# Patient Record
Sex: FEMALE | Race: BLACK OR AFRICAN AMERICAN | Employment: OTHER | URBAN - METROPOLITAN AREA
[De-identification: names, ages, dates, MRNs, and addresses within clinical notes are randomized per-mention and may not be internally consistent; named-entity substitution may affect disease eponyms.]

---

## 2018-07-30 ENCOUNTER — HOSPITAL ENCOUNTER (OUTPATIENT)
Dept: PHYSICAL THERAPY | Age: 53
Discharge: HOME | End: 2018-07-30
Payer: OTHER GOVERNMENT

## 2018-07-30 PROCEDURE — 97161 PT EVAL LOW COMPLEX 20 MIN: CPT | Performed by: PHYSICAL THERAPIST

## 2018-07-30 PROCEDURE — 97110 THERAPEUTIC EXERCISES: CPT | Performed by: PHYSICAL THERAPIST

## 2018-07-30 NOTE — PROGRESS NOTES
PT  EVAL AND TREATMENT    Patient Name: Ariela Drafts  Date:2018  : 1965  [x]  Patient  Verified  Payor: /    In time:140  Out time:2:35pm  Total Treatment Time (min): 55  Total Timed Codes (min): 55  1:1 Treatment Time ( W Kuldip Suarez only): 54   Visit #: 1 of     Treatment Area: Myalgia [M79.1]      Objective evaluation:  Physical Therapy Evaluation Cervical Spine - [facility]    SUBJECTIVE  Chief Complaint: patient states she woke up and arm was hurting, and states she could get no relief. She has a round of Prednisone, which gave pain relief for about a month, then came back. She reports L shoulder and UE pain, stopping above the elbow. Patient reports occasional c/s pain. MRI was performed on c/s, shoulder yesterday, with results unknown. Pain is a deep aching sensation. Pain ranges from 2 to 6-7. Pain increases with dressing, reaching OH, taking a shower, picking up items, lying on L side. Pain reduces with heat pack, medication. Mechanism of injury:    Symptoms  Pain rating (0-10):    Today: 2   Best:    Worst:    [x] Contstant:    [] Intermittent:     Aggravated by:   [] Bending [] Sitting [] Standing [] Reaching Overhead   [x] Moving [] Cough [] Sneeze [] Eating   [] AM  [] PM  Lying:  [] sup   [] pro   [x] sidelying   [] Other:lifting   Eased by:    [] Bending [] Sitting [] Standing Lying: [x] sup  [] pro  [] sidelying   [] Moving [] AM  [] PM  [] Other:       Past History/Treatments:    Diagnostic Tests: [] Lab work [] X-rays    [] CT [x] MRI     [] Other:  Results:    Functional Status  Prior level of function:  Present functional limitations:  What position do you sleep in?:    Physical Therapy Evaluation - Shoulder    Posture: [] Poor    [] Fair    [] Good    Describe: FH posture     ROM:  [] Unable to assess at this time                                           AROM                                                              PROM   Left Right  Left Right   Flexion 65deg 138 Flexion 90deg Extension 33  Extension     Scaption/ABD 50 125 Scaption/ABD 80deg    ER @ 0 Degrees neutral  ER @ 0 Degrees neutral    ER @ 90 Degrees   ER @ 90 Degrees     IR @ 90 Degrees To prox glut T5 IR @ 90 Degrees     C/s AROM is 75% all ranges with no p! End Feel / Painful Arc:    Strength:   [x] Unable to assess at this time       R is 4+ overall                                                                        L (1-5) R (1-5) Pain   Flexors  4+ [] Yes   [] No   Abductors 4-p! [] Yes   [] No   External Rotators   [] Yes   [] No   Internal Rotators   [] Yes   [] No   Supraspinatus   [] Yes   [] No   Serratus Anterior   [] Yes   [] No   Lower Trapezius   [] Yes   [] No   Elbow Flexion 4p! [] Yes   [] No   Elbow Extension   [] Yes   [] No       Scapulohumoral Control / Rhythm:  Able to eccentrically lower with good control?  Left: [] Yes   [x] No     Right: [x] Yes   [] No    Accessory Motions: tightness in post/inferior    Palpation  [] Min  [x] Mod  [] Severe    Location: L LS, UT mm, SA space,  [] Min  [] Mod  [] Severe    Location:  [] Min  [] Mod  [] Severe    Location:    Optional Tests:    Sensation Left Right Reflexes Left Right   Biceps (C5)   Biceps (C5)     Gutiérrez Radial(C6-7)   Brachioradialis (C6)     Gutiérrez Ulnar(C8-T1)   Triceps (C7)       Adson's Test  [] Pos   [] Neg Yergason's Test [] Pos   [] Neg  Tracie's Test  [] Pos   [] Neg [place]'s Sign [] Pos   [] Neg  Neer's Test  [x] Pos   [] Neg Clunk Test  [] Pos   [] Neg  Hawkin's Test  [x] Pos   [] Neg AC Joint  [] Pos   [] Neg  Speed's Test  [] Pos   [] Neg SC Joint  [] Pos   [] Neg  Empty Can  [] Pos   [] Neg Pectoral Tightness [x] Pos   [] Neg  Anterior Apprehension [] Pos   [] Neg   Posterior Apprehension [] Pos   [] Neg      Other Tests / Comments:       OBJECTIVE  Posture: [] WNL  Head Position: FHRS on L  Shoulder/Scapular Position: protracted      TMJ: [] N/A [] Abnormal - ROM:   Palpation:    Cervical Retraction: [x] WNL    [] Abnormal:    Shoulder/Scapular Screen: [] WNL    [x] Abnormal:    Thoracic Spine: [] N/A    [] WNL   [] Other:    PROM:    Palpation:  [] Min  [] Mod  [] Severe    Location:  [] Min  [] Mod  [] Severe    Location:  [] Min  [] Mod  [] Severe    Location:    Neuro Screen (myotome/dematome/felexes): [x] WNL  Myotome Level Muscle Test Myotome Level Muscle Test   C5 Shoulder Adduction - Deltoid C8 Finger Flexors   C6 Wrist Extension T1 Finger Abduction - Interossei   C7 Elbow Extension     Comments:      Special Tests:  Cervical:        Vertebral Artery:  [] R    [] L    [] +    [] -       Alar Ligament: [] R    [] L    [] +    [] -       Transverse Lig: [] R    [] L    [] +    [] -       Spurling's:  [] R    [x] L    [] +    [x] -       Distraction:  [] R    [] L    [] +    [] -       Compression: [] R    [] L    [] +    [] -      Diaphragmatic Breathing: [] Normal    [] Abnormal          Justification for Eval Code Complexity:  Patient History : HTN  Examination see exam as above   Clinical Presentation: evolving   Clinical Decision Making : FOTO : 77 /100        Patient Education: [x] Established HEP    [x] POT (minutes) :15 min HEP     Pain Level (0-10 scale) post treatment:   ASSESSMENT  [x]  See Plan of Care    PLAN  [x]  Upgrade activities as tolerated     [x] Other:_ POC  2-3 x/wk for 4 weeks    Mandy Oro, PT 7/30/2018  1:41 PM

## 2018-07-30 NOTE — PROGRESS NOTES
[id number] [facility] AT [facility]  [address] [place] [address],[address], Faustino chapman, Jennifer Hernandez  Phone [phone]  Fax [phone] / 007 [facility] PHYSICAL THERAP[facility]  Patient Name: Collin Blas : 1965   Medical   Diagnosis: Myalgia [M79.1] Treatment Diagnosis: myalgia   Onset Date: 6 months     Referral Source: Shan Witt MD Start of Care [facility]): 2018   Prior Hospitalization: See medical history Provider #: [id number]   Prior Level of Function: Independent   Comorbidities: HTN   Medications: Verified on Patient Summary List   The Plan of Care and following information is based on the information from the initial evaluation.   =================================================================================  Assessment / key information: Patient is a 45 y.o. female who presents to [facility] with Dx of myalgia. Patient states she woke up 6 months ago and arm was hurting, and states she could get no relief. She has a round of Prednisone, which gave pain relief for about a month, then came back. She reports L shoulder and UE pain, stopping above the elbow. Patient reports occasional c/s pain. MRI was performed on c/s, shoulder yesterday, with results unknown. Pain is a deep aching sensation. Pain ranges from 2 to 6-7. Pain increases j(functional limitations) with dressing, reaching OH, taking a shower, picking up items, lying on L side. Pain reduces with heat pack, medication. Average reported pain level  2/at best & 9/at worst. Upon objective evaluation patient demonstrates FH posture with L UE in retracted/fwd, guarded position. L shoulder AROM decreased and painful:  flexion: 65deg, scaption: 50deg, ER: to neutral, FIR to proximal glut. Strength testing deferred due to high pain levels. TTP in post cuff mm, pect mm, L UT/LS mm, L Subacromial space. R shoulder AROM is WFL, strength is 4+ overall.  Special tests: Ibarra/ling +, Neers +. Decreased capsular mobility noted inferior/posterior. Neg spurlings and c/s was cleared. Patient scored 76 on FOTO indicating decreased functional activity level and QOL. A home exercise program was demonstrated and provided to address the above objective and functional deficits. Patient can benefit from PT interventions to improve AROM, strength, decrease pain, to facilitate ADLs & overall functional status.   =========================================================================  Eval Complexity: History: MEDIUM  Complexity : 1-2 comorbidities / personal factors will impact the outcome/ POC Exam:MEDIUM Complexity : 3 Standardized tests and measures addressing body structure, function, activity limitation and / or participation in recreation  Presentation: MEDIUM Complexity : Evolving with changing characteristics  Clinical Decision Making:LOW Complexity : FOTO score of 75-100Overall Complexity:LOW   Problem List: pain affecting function, decrease ROM, decrease strength, edema affecting function, impaired gait/ balance, decrease ADL/ functional abilitiies, decrease activity tolerance, decrease flexibility/ joint mobility and decrease transfer abilities   Treatment Plan may include any combination of the following: Therapeutic exercise, Therapeutic activities, Neuromuscular re-education, Physical agent/modality, Manual therapy and Patient education  Patient / Family readiness to learn indicated by: asking questions, trying to perform skills and interest  Persons(s) to be included in education: patient (P)  Barriers to Learning/Limitations: None  Measures taken:    Patient Goal (s): No pain, regain strength   Rehabilitation Potential: excellent   Short Term Goals: To be accomplished in  2  weeks:  1) Establish HEP to prevent further disability. 2) Patient will report decreased c/o pain to < or = 3/10 to facilitate dressing/bathing with manageable sx in L shoulder.      Long Term Goals: To be accomplished in  4  weeks:  1) Patient to be independent & compliant with HEP in preparation for D/C.  2) Improve overall strength of L shoulder to 4+/5 so strength is available for return to light lifting activities at work/home. 3) Patient to report 75% improvement in overall function in preparation for return to ADLS with manageable sx in L shoulder. 4) Patient to increase FOTO score to 77 indicating improved functional abilities and QOL  5.) Patient to increase AROM of L shoulder flexion and scaption to 120deg to facilitate reaching/dressing.  6.) Patient to increase FIR to L1 to facilitate dressing. Frequency / Duration:   Patient to be seen  2  times per week for 4  weeks:  Patient / Caregiver education and instruction: self care, activity modification, brace/ splint application and exercises  G-Codes (GP):     Therapist Signature: Oziel Barnes PT Date: 1/11/5426   Certification Period:  Time: 5:17 PM     ===========================================================================================  I certify that the above Physical Therapy Services are being furnished while the patient is under my care. I agree with the treatment plan and certify that this therapy is necessary. Physician Signature:        Date:       Time:     Please sign and return to [facility] or you may fax the signed copy to [phone]. Thank you.

## 2018-08-06 ENCOUNTER — HOSPITAL ENCOUNTER (OUTPATIENT)
Dept: PHYSICAL THERAPY | Age: 53
Discharge: HOME | End: 2018-08-06
Payer: OTHER GOVERNMENT

## 2018-08-06 PROCEDURE — 97140 MANUAL THERAPY 1/> REGIONS: CPT | Performed by: PHYSICAL THERAPIST

## 2018-08-06 PROCEDURE — 97110 THERAPEUTIC EXERCISES: CPT | Performed by: PHYSICAL THERAPIST

## 2018-08-06 NOTE — PROGRESS NOTES
PT DAILY TREATMENT NOTE     Patient Name: Abner Abdi  Date:2018  : 1965  [x]  Patient  Verified  Payor:  / Plan: [facility]  RETIREES AND DEPENDENTS / Product Type: [place] /    In time:559  Out time:652  Total Treatment Time (min): 48  Visit #: 2 of     Treatment Area: Myalgia [M79.1]    SUBJECTIVE  Pain Level (0-10 scale): 3/10  Any medication changes, allergies to medications, adverse drug reactions, diagnosis change, or new procedure performed?: [x] No    [] Yes (see summary sheet for update)  Subjective functional status/changes:   [] No changes reported  My arm still hurts.     OBJECTIVE    Modality rationale: decrease pain and increase tissue extensibility to improve the patients ability to improve post session soreness    Min Type Additional Details    [] Estim:  []Unatt       []IFC  []Premod                        []Other:  []w/ice   []w/heat  Position:  Location:    [] Estim: []Att    []TENS instruct  []NMES                    []Other:  []w/US   []w/ice   []w/heat  Position:  Location:    []  Traction: [] Cervical       []Lumbar                       [] Prone          []Supine                       []Intermittent   []Continuous Lbs:  [] before manual  [] after manual    []  Ultrasound: []Continuous   [] Pulsed                           []1MHz   []3MHz W/cm2:  Location:    []  Iontophoresis with dexamethasone         Location: [] Take home patch   [] In clinic   10 []  Ice     [x]  heat  []  Ice massage  []  Laser   []  Anodyne Position: semi-reclined  Location: L shoulder    []  Laser with stim  []  Other:  Position:  Location:    []  Vasopneumatic Device Pressure:       [] lo [] med [] hi   Temperature: [] lo [] med [] hi   [] Skin assessment post-treatment:  []intact []redness- no adverse reaction    []redness - adverse reaction:     28 min Therapeutic Exercise:  [] See flow sheet : initiated PT POC   Rationale: increase ROM and increase strength to improve the patients ability to improve OH mobility to improve activity tolerance    15 min Manual Therapy: Technique:      [x] S/DTM []IASTM []PROM [] Passive Stretching   [] Graston:  [] GT 1  [] GT 2 [] GT 3 [] GT 4 [] GT 4 [] GT 5  [] GT 6  [] Sweep [] Fan [] [place]  [] Brush   []  Swivel []J- Stroke [] Scoop []IFraming     [x]Manual TPR  [] TDN (see objective; actual needle insertion time not billed)  [x]Jt manipulation:Gr I [] II []  III [x] IV[] V[]  Treatment/Area:  L shoulder/scapular region   Rationale:      decrease pain, increase ROM, increase tissue extensibility and decrease trigger points to improve patient's ability to improve OH mobility       With   [] TE   [] TA   [] neuro   [] other: Patient Education: [x] Review HEP    [] Progressed/Changed HEP based on:   [] positioning   [] body mechanics   [] transfers   [] heat/ice application    [] Graston Education: Explained the effects and benefits of Graston Technique therapy including potential for post treatment soreness and bruising. [] Other:      Other Objective/Functional Measures:   Pain with all therex. PROM flexion ~90 deg, abduction: ~75 deg, ER: to neutral     Pain Level (0-10 scale) post treatment: 1/10    ASSESSMENT/Changes in Function:   Pt presents with capsular pattern Abd>ER>flexion> IR indicating possible adhesive capsulitis of the L shoulder vs. Cervical spine. Pt educated to FU with Ortho specialist for management. Assess effects of ROM to improve OH mobility with reduced pain to return to PLOF symptom free. Patient will continue to benefit from skilled PT services to modify and progress therapeutic interventions, address functional mobility deficits, address ROM deficits, address strength deficits, analyze and address soft tissue restrictions and analyze and modify body mechanics/ergonomics to attain remaining goals.        Progress towards goals / Updated goals:  1st FU visit, initiated PT POC    PLAN  [x]  Upgrade activities as tolerated [x]  Continue plan of care  []  Update interventions per flow sheet       []  Discharge due to:_  []  Other:_      Robinson Lion DPT 8/6/2018  6:55 PM    Future Appointments  Date Time Provider Nancy Sin   8/13/2018 6:00 PM Robinson Lion DPT [facility]   8/20/2018 6:00 PM Robinson Lion DPT [facility]   8/23/2018 6:00 PM [place] [facility]   8/27/2018 6:00 PM Robinson Lion DPT [facility]   8/30/2018 6:00 PM Chuy Garcia PT [facility]

## 2018-08-09 ENCOUNTER — HOSPITAL ENCOUNTER (OUTPATIENT)
Dept: PHYSICAL THERAPY | Age: 53
End: 2018-08-09
Payer: OTHER GOVERNMENT

## 2018-08-13 ENCOUNTER — APPOINTMENT (OUTPATIENT)
Dept: PHYSICAL THERAPY | Age: 53
End: 2018-08-13
Payer: OTHER GOVERNMENT

## 2018-08-20 ENCOUNTER — APPOINTMENT (OUTPATIENT)
Dept: PHYSICAL THERAPY | Age: 53
End: 2018-08-20
Payer: OTHER GOVERNMENT

## 2018-08-23 ENCOUNTER — APPOINTMENT (OUTPATIENT)
Dept: PHYSICAL THERAPY | Age: 53
End: 2018-08-23
Payer: OTHER GOVERNMENT

## 2018-08-27 ENCOUNTER — APPOINTMENT (OUTPATIENT)
Dept: PHYSICAL THERAPY | Age: 53
End: 2018-08-27
Payer: OTHER GOVERNMENT

## 2018-08-30 ENCOUNTER — APPOINTMENT (OUTPATIENT)
Dept: PHYSICAL THERAPY | Age: 53
End: 2018-08-30
Payer: OTHER GOVERNMENT

## 2018-10-05 PROBLEM — I21.19 ACUTE ST ELEVATION MYOCARDIAL INFARCTION (STEMI) OF INFERIOR WALL (HCC): Status: ACTIVE | Noted: 2018-10-05

## 2018-10-08 PROBLEM — I32 PERICARDITIS SECONDARY TO ACUTE MYOCARDIAL INFARCTION (HCC): Status: ACTIVE | Noted: 2018-10-08

## 2018-10-08 PROBLEM — I24.1 DRESSLER'S SYNDROME (HCC): Status: ACTIVE | Noted: 2018-10-08

## 2018-10-08 PROBLEM — I21.9 PERICARDITIS SECONDARY TO ACUTE MYOCARDIAL INFARCTION (HCC): Status: ACTIVE | Noted: 2018-10-08

## 2018-10-08 PROBLEM — E78.00 HYPERCHOLESTEROLEMIA: Status: ACTIVE | Noted: 2018-10-08

## 2019-04-17 NOTE — PROGRESS NOTES
[address] [facility]  [address] [place] [address],[address], Faustino chapman, [place] Hernandez  Phone [phone]  Fax [phone]  DISCHARGE SUMMARY  Patient Name: Valerie Urbina : 1965   Treatment/Medical Diagnosis: Myalgia [M79.1]   Referral Source: Ivon Anderson MD     Date of Initial Visit: 18 Attended Visits: 2 Missed Visits: 0     SUMMARY OF TREATMENT  Pt seen for 2 appts for L shoulder pain. Pt called on 18 and self-DC'd due to copayment. Please DC from treatment at this time. RECOMMENDATIONS  Other: Self DC secondary to copayment  If you have any questions/comments please contact us directly at [phone]. Thank you for allowing us to assist in the care of your patient.     Therapist Signature: Sam Patrick DPT Date: 18     Time: 9:14 AM

## 2022-03-19 PROBLEM — I32 PERICARDITIS SECONDARY TO ACUTE MYOCARDIAL INFARCTION (HCC): Status: ACTIVE | Noted: 2018-10-08

## 2022-03-19 PROBLEM — I21.9 PERICARDITIS SECONDARY TO ACUTE MYOCARDIAL INFARCTION (HCC): Status: ACTIVE | Noted: 2018-10-08

## 2022-03-20 PROBLEM — I24.1 DRESSLER'S SYNDROME (HCC): Status: ACTIVE | Noted: 2018-10-08

## 2022-03-20 PROBLEM — I21.19 ACUTE ST ELEVATION MYOCARDIAL INFARCTION (STEMI) OF INFERIOR WALL (HCC): Status: ACTIVE | Noted: 2018-10-05

## 2022-03-20 PROBLEM — E78.00 HYPERCHOLESTEROLEMIA: Status: ACTIVE | Noted: 2018-10-08

## 2023-01-31 RX ORDER — CLOPIDOGREL BISULFATE 75 MG/1
75 TABLET ORAL DAILY
COMMUNITY
Start: 2018-10-12

## 2023-01-31 RX ORDER — AMLODIPINE BESYLATE 10 MG/1
10 TABLET ORAL DAILY
COMMUNITY

## 2023-01-31 RX ORDER — ASPIRIN 325 MG
650 TABLET ORAL 2 TIMES DAILY
COMMUNITY
Start: 2018-10-11

## 2023-01-31 RX ORDER — NITROGLYCERIN 0.4 MG/1
0.4 TABLET SUBLINGUAL
COMMUNITY
Start: 2018-10-11

## 2023-01-31 RX ORDER — METOPROLOL SUCCINATE 25 MG/1
25 TABLET, EXTENDED RELEASE ORAL DAILY
COMMUNITY
Start: 2018-10-12

## 2023-01-31 RX ORDER — ATORVASTATIN CALCIUM 10 MG/1
10 TABLET, FILM COATED ORAL DAILY
COMMUNITY